# Patient Record
Sex: MALE | Race: WHITE | Employment: OTHER | URBAN - METROPOLITAN AREA
[De-identification: names, ages, dates, MRNs, and addresses within clinical notes are randomized per-mention and may not be internally consistent; named-entity substitution may affect disease eponyms.]

---

## 2017-12-27 ENCOUNTER — HOSPITAL ENCOUNTER (OUTPATIENT)
Dept: VASCULAR LAB | Age: 82
Discharge: HOME | End: 2017-12-27
Attending: SURGERY | Admitting: SURGERY

## 2017-12-27 DIAGNOSIS — I65.23 BILATERAL CAROTID ARTERY STENOSIS: Primary | ICD-10-CM

## 2017-12-27 DIAGNOSIS — I70.90 ARTERIAL OCCLUSIVE DISEASE: ICD-10-CM

## 2017-12-27 DIAGNOSIS — Z95.9 PRESENCE OF CARDIAC AND VASCULAR IMPLANT AND GRAFT: ICD-10-CM

## 2018-01-08 ENCOUNTER — HOSPITAL ENCOUNTER (OUTPATIENT)
Dept: VASCULAR LAB | Age: 83
Discharge: HOME | End: 2018-01-08
Attending: SURGERY | Admitting: SURGERY

## 2018-01-08 DIAGNOSIS — Z95.9 PRESENCE OF CARDIAC AND VASCULAR IMPLANT AND GRAFT: ICD-10-CM

## 2018-01-08 DIAGNOSIS — Z95.9 S/P ARTERIAL STENT: Primary | ICD-10-CM

## 2018-01-08 DIAGNOSIS — I70.90 OCCLUSIVE DISEASE, ARTERIAL: ICD-10-CM

## 2018-12-31 ENCOUNTER — ANESTHESIA EVENT (OUTPATIENT)
Dept: ENDOSCOPY | Age: 83
End: 2018-12-31
Payer: MEDICARE

## 2019-01-16 ENCOUNTER — HOSPITAL ENCOUNTER (OUTPATIENT)
Age: 84
Setting detail: OUTPATIENT SURGERY
Discharge: HOME | End: 2019-01-16
Attending: INTERNAL MEDICINE | Admitting: INTERNAL MEDICINE
Payer: MEDICARE

## 2019-01-16 ENCOUNTER — ANESTHESIA (OUTPATIENT)
Dept: ENDOSCOPY | Age: 84
End: 2019-01-16
Payer: MEDICARE

## 2019-01-16 VITALS — SYSTOLIC BLOOD PRESSURE: 99 MMHG | DIASTOLIC BLOOD PRESSURE: 71 MMHG | OXYGEN SATURATION: 100 %

## 2019-01-16 VITALS
BODY MASS INDEX: 18.55 KG/M2 | HEART RATE: 66 BPM | HEIGHT: 73 IN | DIASTOLIC BLOOD PRESSURE: 68 MMHG | WEIGHT: 140 LBS | OXYGEN SATURATION: 100 % | SYSTOLIC BLOOD PRESSURE: 145 MMHG | RESPIRATION RATE: 18 BRPM | TEMPERATURE: 97.5 F

## 2019-01-16 PROCEDURE — 7100000011 HC PHASE II RECOVERY - ADDTL 15 MIN: Performed by: INTERNAL MEDICINE

## 2019-01-16 PROCEDURE — 3700000001 HC ADD 15 MINUTES (ANESTHESIA): Performed by: INTERNAL MEDICINE

## 2019-01-16 PROCEDURE — 6370000000 HC RX 637 (ALT 250 FOR IP): Performed by: INTERNAL MEDICINE

## 2019-01-16 PROCEDURE — 7100000010 HC PHASE II RECOVERY - FIRST 15 MIN: Performed by: INTERNAL MEDICINE

## 2019-01-16 PROCEDURE — 3609009500 HC COLONOSCOPY DIAGNOSTIC OR SCREENING: Performed by: INTERNAL MEDICINE

## 2019-01-16 PROCEDURE — 2500000003 HC RX 250 WO HCPCS: Performed by: NURSE ANESTHETIST, CERTIFIED REGISTERED

## 2019-01-16 PROCEDURE — 2580000003 HC RX 258: Performed by: ANESTHESIOLOGY

## 2019-01-16 PROCEDURE — 2709999900 HC NON-CHARGEABLE SUPPLY: Performed by: INTERNAL MEDICINE

## 2019-01-16 PROCEDURE — 3700000000 HC ANESTHESIA ATTENDED CARE: Performed by: INTERNAL MEDICINE

## 2019-01-16 PROCEDURE — 6360000002 HC RX W HCPCS: Performed by: NURSE ANESTHETIST, CERTIFIED REGISTERED

## 2019-01-16 RX ORDER — PROPOFOL 10 MG/ML
INJECTION, EMULSION INTRAVENOUS PRN
Status: DISCONTINUED | OUTPATIENT
Start: 2019-01-16 | End: 2019-01-16 | Stop reason: SDUPTHER

## 2019-01-16 RX ORDER — LIDOCAINE HYDROCHLORIDE 10 MG/ML
1 INJECTION, SOLUTION EPIDURAL; INFILTRATION; INTRACAUDAL; PERINEURAL
Status: DISCONTINUED | OUTPATIENT
Start: 2019-01-16 | End: 2019-01-16 | Stop reason: HOSPADM

## 2019-01-16 RX ORDER — LIDOCAINE HYDROCHLORIDE 20 MG/ML
INJECTION, SOLUTION INFILTRATION; PERINEURAL PRN
Status: DISCONTINUED | OUTPATIENT
Start: 2019-01-16 | End: 2019-01-16 | Stop reason: SDUPTHER

## 2019-01-16 RX ORDER — SODIUM CHLORIDE 9 MG/ML
INJECTION, SOLUTION INTRAVENOUS CONTINUOUS
Status: DISCONTINUED | OUTPATIENT
Start: 2019-01-16 | End: 2019-01-16 | Stop reason: HOSPADM

## 2019-01-16 RX ORDER — MELOXICAM 7.5 MG/1
7.5 TABLET ORAL DAILY
COMMUNITY
End: 2022-05-14

## 2019-01-16 RX ADMIN — PROPOFOL 40 MG: 10 INJECTION, EMULSION INTRAVENOUS at 09:59

## 2019-01-16 RX ADMIN — SODIUM CHLORIDE: 9 INJECTION, SOLUTION INTRAVENOUS at 09:10

## 2019-01-16 RX ADMIN — PROPOFOL 100 MG: 10 INJECTION, EMULSION INTRAVENOUS at 09:48

## 2019-01-16 RX ADMIN — PROPOFOL 50 MG: 10 INJECTION, EMULSION INTRAVENOUS at 09:54

## 2019-01-16 RX ADMIN — LIDOCAINE HYDROCHLORIDE 100 MG: 20 INJECTION, SOLUTION INFILTRATION; PERINEURAL at 09:48

## 2019-01-16 ASSESSMENT — PAIN SCALES - GENERAL
PAINLEVEL_OUTOF10: 0

## 2019-01-16 ASSESSMENT — ENCOUNTER SYMPTOMS: SHORTNESS OF BREATH: 0

## 2019-10-10 ENCOUNTER — HOSPITAL ENCOUNTER (EMERGENCY)
Age: 84
Discharge: HOME | End: 2019-10-10
Payer: MEDICARE

## 2019-10-10 VITALS
OXYGEN SATURATION: 96 % | DIASTOLIC BLOOD PRESSURE: 76 MMHG | TEMPERATURE: 97.7 F | RESPIRATION RATE: 16 BRPM | HEIGHT: 74 IN | HEART RATE: 76 BPM | WEIGHT: 140 LBS | BODY MASS INDEX: 17.97 KG/M2 | SYSTOLIC BLOOD PRESSURE: 139 MMHG

## 2019-10-10 DIAGNOSIS — H61.21 HEARING LOSS OF RIGHT EAR DUE TO CERUMEN IMPACTION: Primary | ICD-10-CM

## 2019-10-10 PROCEDURE — 4500000022 HC ED LEVEL 2 PROCEDURE

## 2019-10-10 PROCEDURE — 99282 EMERGENCY DEPT VISIT SF MDM: CPT

## 2019-10-10 ASSESSMENT — ENCOUNTER SYMPTOMS
NAUSEA: 0
SHORTNESS OF BREATH: 0
CHEST TIGHTNESS: 0
SINUS PAIN: 0
DIARRHEA: 0
VOMITING: 0
SORE THROAT: 0
ABDOMINAL PAIN: 0
VOICE CHANGE: 0

## 2020-11-21 ENCOUNTER — HOSPITAL ENCOUNTER (EMERGENCY)
Age: 85
Discharge: HOME | End: 2020-11-21
Payer: MEDICARE

## 2020-11-21 VITALS
TEMPERATURE: 97.1 F | RESPIRATION RATE: 16 BRPM | HEART RATE: 70 BPM | HEIGHT: 74 IN | SYSTOLIC BLOOD PRESSURE: 138 MMHG | BODY MASS INDEX: 19.25 KG/M2 | DIASTOLIC BLOOD PRESSURE: 70 MMHG | OXYGEN SATURATION: 98 % | WEIGHT: 150 LBS

## 2020-11-21 PROCEDURE — 99283 EMERGENCY DEPT VISIT LOW MDM: CPT

## 2020-11-21 PROCEDURE — 69209 REMOVE IMPACTED EAR WAX UNI: CPT

## 2020-11-21 RX ORDER — OFLOXACIN 3 MG/ML
10 SOLUTION AURICULAR (OTIC) DAILY
Qty: 5 ML | Refills: 0 | Status: SHIPPED | OUTPATIENT
Start: 2020-11-21 | End: 2020-12-01

## 2020-11-21 ASSESSMENT — ENCOUNTER SYMPTOMS
VOMITING: 0
COUGH: 0
BACK PAIN: 0
NAUSEA: 0
ABDOMINAL PAIN: 0
EYE PAIN: 0
SHORTNESS OF BREATH: 0

## 2020-11-21 NOTE — ED PROVIDER NOTES
[address] [facility]        Pt Name: Sedrick Pollard  MRN: [id number]  [place] 1935  Date of evaluation: 11/21/2020  Provider: DONNA Plata  PCP: Marcelo Shields MD    DESIREE. I have evaluated this patient. My supervising physician was available for consultation. CHIEF COMPLAINT       Chief Complaint   Patient presents with    Hearing Problem     TO RIGHT EAR FOR PAST WEEK. REPORTS \"IT'S CLOGGED\"       HISTORY OF PRESENT ILLNESS   (Location, Timing/Onset, Context/Setting, Quality, Duration, Modifying Factors, Severity, Associated Signs and Symptoms)  Note limiting factors. Sedrick Pollard is a 81 y.o. male for Stacie Bath right ear hearing loss. Reports over the last week he has had progressively worsening hearing from his right ear. States it feels like it is clogged. Has had previous episodes of cerumen impaction. Denies headache, tenderness, fever, ear pain, purulent drainage, chest pain, shortness of breath, cough, nausea, vomiting. Denies alleviating or aggravating factors. Nursing Notes were all reviewed and agreed with or any disagreements were addressed in the HPI. REVIEW OF SYSTEMS    (2-9 systems for level 4, 10 or more for level 5)     Review of Systems   Constitutional: Negative for fever. HENT: Positive for hearing loss. Negative for ear discharge, ear pain and tinnitus. Eyes: Negative for pain and visual disturbance. Respiratory: Negative for cough and shortness of breath. Cardiovascular: Negative for chest pain. Gastrointestinal: Negative for abdominal pain, nausea and vomiting. Musculoskeletal: Negative for back pain and neck pain. Skin: Negative for rash. Neurological: Negative for dizziness, weakness, numbness and headaches. Psychiatric/Behavioral: Negative for confusion. Positives and Pertinent negatives as per HPI.   Except as noted above in the ROS, all other systems were reviewed and negative. PAST MEDICAL HISTORY     Past Medical History:   Diagnosis Date    Hyperlipidemia     Hypertension          SURGICAL HISTORY     Past Surgical History:   Procedure Laterality Date    COLONOSCOPY N/A 1/16/2019    COLONOSCOPY performed by Desean Trinidad MD at [address] [facility] Left 2018    THROMBECTOMY Right          [address]       Discharge Medication List as of 11/21/2020 11:49 AM      CONTINUE these medications which have NOT CHANGED    Details   meloxicam (MOBIC) 7.5 MG tablet Take 7.5 mg by mouth dailyHistorical Med      atenolol (TENORMIN) 50 MG tablet Historical Med      atorvastatin (LIPITOR) 20 MG tablet Historical Med      aspirin 81 MG EC tablet Take 81 mg by mouth. ALLERGIES     Patient has no known allergies. FAMILYHISTORY     History reviewed. No pertinent family history. SOCIAL HISTORY       Social History     Tobacco Use    Smoking status: Former Smoker     Packs/day: 1.00     Years: 40.00     Pack years: 40.00    Smokeless tobacco: Never Used   Substance Use Topics    Alcohol use: Yes     Alcohol/week: 4.2 standard drinks     Types: 5 Standard drinks or equivalent per week     Comment: 1 BEER/DAY    Drug use: No       SCREENINGS             PHYSICAL EXAM    (up to 7 for level 4, 8 or more for level 5)     ED Triage Vitals [11/21/20 1029]   BP Temp Temp Source Pulse Resp SpO2 Height Weight   138/70 97.1 °F (36.2 °C) Temporal 70 16 98 % 6' 2\" (1.88 m) 150 lb (68 kg)       Physical Exam  Vitals signs reviewed. Constitutional:       Appearance: He is not diaphoretic. HENT:      Head: Normocephalic and atraumatic. Right Ear: Tympanic membrane and external ear normal. There is impacted cerumen. Left Ear: Tympanic membrane, ear canal and external ear normal. There is no impacted cerumen. Ears:      Comments: No mastoid tenderness or swelling bilaterally.      Nose: No congestion or rhinorrhea. Eyes:      General: No scleral icterus. Conjunctiva/sclera: Conjunctivae normal.   Neck:      Musculoskeletal: Normal range of motion and neck supple. No neck rigidity or muscular tenderness. Pulmonary:      Effort: Pulmonary effort is normal. No respiratory distress. Breath sounds: No stridor. Musculoskeletal: Normal range of motion. Skin:     General: Skin is warm and dry. Neurological:      Mental Status: He is alert. Gait: Gait normal.   Psychiatric:         Mood and Affect: Mood normal.         Behavior: Behavior normal.         DIAGNOSTIC RESULTS   LABS:    Labs Reviewed - No data to display    All other labs were within normal range or not returned as of this dictation. EKG: All EKG's are interpreted by the Emergency Department Physician in the absence of a cardiologist.  Please see their note for interpretation of EKG. RADIOLOGY:   Non-plain film images such as CT, Ultrasound and MRI are read by the radiologist. Plain radiographic images are visualized and preliminarily interpreted by the ED Provider with the below findings:        Interpretation per the Radiologist below, if available at the time of this note:    No orders to display     No results found. PROCEDURES   Unless otherwise noted below, none     Ear Wax Removal    Date/Time: 11/21/2020 10:00 PM  Performed by: DONNA Perdomo  Authorized by: DONNA Perdomo     Consent:     Consent obtained:  Verbal    Consent given by:  Patient    Risks discussed:  Bleeding, infection, incomplete removal and TM perforation    Alternatives discussed:  No treatment  Procedure details:     Location:  R ear    Procedure type: irrigation      Procedure type comment:  Peroxide used to soften cerumen. Irrigation followed by intermittent curette. Post-procedure details:      Inspection:  TM intact and bleeding (Small bleeding of right external canal, no perforation or bleeding from TM appreciated.) Hearing quality:  Improved    Patient tolerance of procedure: Tolerated well, no immediate complications        CRITICAL CARE TIME   N/A    CONSULTS:  None      EMERGENCY DEPARTMENT COURSE and DIFFERENTIAL DIAGNOSIS/MDM:   Vitals:    Vitals:    11/21/20 1029   BP: 138/70   Pulse: 70   Resp: 16   Temp: 97.1 °F (36.2 °C)   TempSrc: Temporal   SpO2: 98%   Weight: 150 lb (68 kg)   Height: 6' 2\" (1.88 m)       Patient was given the following medications:  Medications - No data to display        52-year-old male presents emergency department for right ear hearing loss over 1 week. Exam is notable for impacted cerumen. Plan to perform cerumen disimpaction, reevaluate. Cerumen removal per procedure note. Patient noted resolution of his hearing loss. Pain following procedure. There was mild bleeding on external ear canal, ofloxin otic drops prescribed. Appropriate for discharge with outpatient follow-up. Given prescription for Debrox. Structure with primary care provider to ideally be seen within 1 week. Instructed to return return for new or worsening symptoms including to fever, hearing loss, ear pain, purulent discharge, headache, vision changes, any other symptoms he is concerned about. Verbal and written discharge instructions and return precautions given. FINAL IMPRESSION      1. Impacted cerumen of right ear          DISPOSITION/PLAN   DISPOSITION Decision To Discharge 11/21/2020 11:49:05 AM      PATIENT REFERREDTO:  MD [address]  [address] [facility] [address]  [phone]    Call in 1 day  Call to schedule primary care follow-up, ideally to be seen within 1 week.       DISCHARGE MEDICATIONS:  Discharge Medication List as of 11/21/2020 11:49 AM      START taking these medications    Details   carbamide peroxide (DEBROX) 6.5 % otic solution Place 5 drops into both ears as needed (Ear wax buildup), Disp-1 Bottle,R-0Print      ofloxacin (FLOXIN) 0.3 % otic solution Place 10 drops into the right ear daily for 10 days, Disp-5 mL,R-0Print             DISCONTINUED MEDICATIONS:  Discharge Medication List as of 11/21/2020 11:49 AM                 (Please note that portions of this note were completed with a voice recognition program.  Efforts were made to edit the dictations but occasionally words are mis-transcribed.)    DONNA Ann (electronically signed)         DONNA Ann  11/21/20 8022

## 2022-05-14 ENCOUNTER — HOSPITAL ENCOUNTER (EMERGENCY)
Age: 87
Discharge: HOME | End: 2022-05-14
Attending: EMERGENCY MEDICINE
Payer: MEDICARE

## 2022-05-14 ENCOUNTER — APPOINTMENT (OUTPATIENT)
Dept: CT IMAGING | Age: 87
End: 2022-05-14
Payer: MEDICARE

## 2022-05-14 VITALS
HEART RATE: 94 BPM | DIASTOLIC BLOOD PRESSURE: 76 MMHG | WEIGHT: 155.8 LBS | SYSTOLIC BLOOD PRESSURE: 154 MMHG | BODY MASS INDEX: 19.37 KG/M2 | OXYGEN SATURATION: 100 % | TEMPERATURE: 98.6 F | RESPIRATION RATE: 14 BRPM | HEIGHT: 75 IN

## 2022-05-14 DIAGNOSIS — R93.89 ABNORMAL CT OF THE CHEST: ICD-10-CM

## 2022-05-14 DIAGNOSIS — S46.911A STRAIN OF RIGHT SHOULDER, INITIAL ENCOUNTER: ICD-10-CM

## 2022-05-14 DIAGNOSIS — S16.1XXA ACUTE STRAIN OF NECK MUSCLE, INITIAL ENCOUNTER: Primary | ICD-10-CM

## 2022-05-14 PROCEDURE — 72125 CT NECK SPINE W/O DYE: CPT

## 2022-05-14 PROCEDURE — 71250 CT THORAX DX C-: CPT

## 2022-05-14 PROCEDURE — 99284 EMERGENCY DEPT VISIT MOD MDM: CPT

## 2022-05-14 ASSESSMENT — PAIN SCALES - GENERAL: PAINLEVEL_OUTOF10: 4

## 2022-05-14 ASSESSMENT — PAIN - FUNCTIONAL ASSESSMENT: PAIN_FUNCTIONAL_ASSESSMENT: 0-10

## 2022-05-14 NOTE — ED PROVIDER NOTES
did paint a large deck and is right handed. Images are negative for acute fracture and we have provided soft tissue pain instructions. He will need follow up for the pulmonary abnormality noted. Vinh Brenner was given appropriate discharge instructions. Referral to follow up provider. For further details of [facility] Emergency Department encounter, please see documentation by advanced practice provider DONNA Haley. RADIOLOGY:     CT CERVICAL SPINE WO CONTRAST   Final Result      No evidence of fracture with multilevel degenerative changes as described. CT CHEST WO CONTRAST   Final Result   Severe emphysema with new nodular densities in the right lung compared to   2014. Recommend 3 month imaging follow-up for further characterization as   these may be postinflammatory or infectious      Moderate coronary artery calcification noted. [facility]  [phone]  Schedule an appointment as soon as possible for a visit in 1 week      [facility] Emergency Department  [address]  Go to   If symptoms worsen    FINAL IMPRESSION:    1. Acute strain of neck muscle, initial encounter    2. Strain of right shoulder, initial encounter    3.  Abnormal CT of the chest       DISPOSITION Decision To Discharge 05/14/2022 09:20:30 AM         Karo Ferris MD  05/14/22 1029

## 2022-05-14 NOTE — ED PROVIDER NOTES
[address]        Pt Name: Luz Gorman  MRN: [id number]  [place] 1935  Date of evaluation: 5/14/2022  Provider: Kailee Dillon PA-C  PCP: No primary care provider on file. Note Started: 8:34 AM EDT       DESIREE. I have evaluated this patient. My supervising physician was available for consultation. Lm Prieto MD      CHIEF COMPLAINT       Chief Complaint   Patient presents with    Neck Pain     patient complains of right sided neck pain with movement, pain is down into right side of chest with movement. HISTORY OF PRESENT ILLNESS   (Location, Timing/Onset, Context/Setting, Quality, Duration, Modifying Factors, Severity, Associated Signs and Symptoms)  Note limiting factors. Chief Complaint: Right neck pain, right chest pain    Luz Gorman is a 81 y.o. male who presents with the above complaint. Onset 48 hours ago following PT and a 16 x 16 foot deck. Gradual progression. Worse with movement such as ear to shoulder left with right neck pain as well as some with rotation. Also discomfort with range of motion right shoulder. He feels discomfort right lateral chest with deep breath. Indicates no chest pressure or shortness of breath. He indicates no gastrointestinal or urinary complaints. The patient with history of HTN, HLD and OA. I do see med list of atenolol, Lipitor and meloxicam.  Also takes aspirin 81 mg daily. Nursing Notes were all reviewed and agreed with or any disagreements were addressed in the HPI. REVIEW OF SYSTEMS    (2-9 systems for level 4, 10 or more for level 5)     Review of Systems    Positives and Pertinent negatives as per HPI. Except as noted above in the ROS, all other systems were reviewed and negative.        PAST MEDICAL HISTORY     Past Medical History:   Diagnosis Date    Hyperlipidemia     Hypertension          SURGICAL HISTORY     Past Surgical History:   Procedure Laterality Date    COLONOSCOPY N/A 1/16/2019    COLONOSCOPY performed by Kurtis Yuen MD at [address] [facility] Left 2018    THROMBECTOMY Right          CURRENTMEDICATIONS       Previous Medications    ASPIRIN 81 MG EC TABLET    Take 81 mg by mouth. ATENOLOL (TENORMIN) 50 MG TABLET        ATORVASTATIN (LIPITOR) 20 MG TABLET             ALLERGIES     Patient has no known allergies. FAMILYHISTORY     History reviewed. No pertinent family history. SOCIAL HISTORY       Social History     Tobacco Use    Smoking status: Former Smoker     Packs/day: 1.00     Years: 40.00     Pack years: 40.00    Smokeless tobacco: Never Used   Vaping Use    Vaping Use: Never used   Substance Use Topics    Alcohol use: Yes     Alcohol/week: 4.2 standard drinks     Types: 5 Standard drinks or equivalent per week     Comment: 1 BEER/DAY    Drug use: No       SCREENINGS    [place] Coma Scale  Eye Opening: Spontaneous  Best Verbal Response: Oriented  Best Motor Response: Obeys commands  Noelle Coma Scale Score: 15        PHYSICAL EXAM    (up to 7 for level 4, 8 or more for level 5)     ED Triage Vitals [05/14/22 0747]   BP Temp Temp Source Pulse Resp SpO2 Height Weight   (!) 154/76 98.2 °F (36.8 °C) Oral 94 12 96 % 6' 3\" (1.905 m) 155 lb 12.8 oz (70.7 kg)       Physical Exam  Vitals and nursing note reviewed. Constitutional:       Appearance: Normal appearance. He is well-developed and normal weight. HENT:      Head: Normocephalic and atraumatic. Right Ear: External ear normal.      Left Ear: External ear normal.   Eyes:      General: No scleral icterus. Right eye: No discharge. Left eye: No discharge. Conjunctiva/sclera: Conjunctivae normal.   Cardiovascular:      Rate and Rhythm: Normal rate and regular rhythm. Heart sounds: Normal heart sounds. Pulmonary:      Effort: Pulmonary effort is normal.      Breath sounds: Normal breath sounds. Chest:      Chest wall: Tenderness present. Musculoskeletal:         General: Normal range of motion. Cervical back: Normal range of motion and neck supple. Right lower leg: No edema. Left lower leg: No edema. Comments: Patient with some tenderness over the posterior musculature of the neck on the right and along trapezius on the right. No midline bony tenderness. Skin:     General: Skin is warm and dry. Findings: No rash. Neurological:      General: No focal deficit present. Mental Status: He is alert and oriented to person, place, and time. Mental status is at baseline. Sensory: No sensory deficit. Motor: No weakness. Psychiatric:         Mood and Affect: Mood normal.         Behavior: Behavior normal.         Thought Content: Thought content normal.         Judgment: Judgment normal.         DIAGNOSTIC RESULTS   LABS:    Labs Reviewed - No data to display    When ordered only abnormal lab results are displayed. All other labs were within normal range or not returned as of this dictation. EKG: When ordered, EKG's are interpreted by the Emergency Department Physician in the absence of a cardiologist.  Please see their note for interpretation of EKG. RADIOLOGY:   Non-plain film images such as CT, Ultrasound and MRI are read by the radiologist. Plain radiographic images are visualized and preliminarily interpreted by the ED Provider with the below findings:        Interpretation per the Radiologist below, if available at the time of this note:    CT CERVICAL SPINE WO CONTRAST   Final Result      No evidence of fracture with multilevel degenerative changes as described. CT CHEST WO CONTRAST   Final Result   Severe emphysema with new nodular densities in the right lung compared to   2014. Recommend 3 month imaging follow-up for further characterization as   these may be postinflammatory or infectious      Moderate coronary artery calcification noted. No results found. PROCEDURES   Unless otherwise noted below, none     Procedures    CRITICAL CARE TIME       CONSULTS:  None      EMERGENCY DEPARTMENT COURSE and DIFFERENTIAL DIAGNOSIS/MDM:   Vitals:    Vitals:    05/14/22 0747 05/14/22 0752   BP: (!) 154/76 (!) 154/76   Pulse: 94 94   Resp: 12 14   Temp: 98.2 °F (36.8 °C) 98.6 °F (37 °C)   TempSrc: Oral Oral   SpO2: 96% 100%   Weight: 155 lb 12.8 oz (70.7 kg) 155 lb 12.8 oz (70.7 kg)   Height: 6' 3\" (1.905 m) 6' 3\" (1.905 m)       Patient was given the following medications:  Medications - No data to display      Is this patient to be included in the SEP-1 Core Measure due to severe sepsis or septic shock? No   Exclusion criteria - the patient is NOT to be included for SEP-1 Core Measure due to: Infection is not suspected    This patient presenting with right neck pain and right chest pain following painting of a 16 foot x 16 foot deck. Onset 48 hours ago with progression. CT scan neck and chest without IV contrast showing no concerning pathology. Incidental finding of arthritic changes along with COPD changes. The patient is informed. Patient be treated conservatively with OTC Aleve 220 mg taking 1 tablet or 3 times daily with food and he is aware to add Tylenol 1000 mg every 8 hours as needed for additional pain control. Ice that she may benefit. The patient did express understanding of his diagnosis and treatment plan. This case was reviewed with attending physician who personally evaluated the patient. FINAL IMPRESSION      1. Acute strain of neck muscle, initial encounter    2.  Strain of right shoulder, initial encounter          DISPOSITION/PLAN   DISPOSITION Decision To Discharge 05/14/2022 09:20:30 AM      PATIENT REFERRED TO:  [facility] Pre-Services  [phone]  Schedule an appointment as soon as possible for a visit in 1 week      [facility] Emergency Department  [place] [address]  [phone]  Go to   If symptoms worsen      DISCHARGE MEDICATIONS:  New Prescriptions    No medications on file       DISCONTINUED MEDICATIONS:  Discontinued Medications    MELOXICAM (MOBIC) 7.5 MG TABLET    Take 7.5 mg by mouth daily              (Please note that portions of this note were completed with a voice recognition program.  Efforts were made to edit the dictations but occasionally words are mis-transcribed. )    Chelsea Navarrete PA-C (electronically signed)           Chelsea Navarrete PA-C  05/14/22 0649

## 2022-05-23 ENCOUNTER — OFFICE VISIT (OUTPATIENT)
Dept: FAMILY MEDICINE CLINIC | Age: 87
End: 2022-05-23
Payer: MEDICARE

## 2022-05-23 VITALS
OXYGEN SATURATION: 97 % | DIASTOLIC BLOOD PRESSURE: 66 MMHG | HEART RATE: 110 BPM | TEMPERATURE: 97.2 F | WEIGHT: 154 LBS | HEIGHT: 75 IN | SYSTOLIC BLOOD PRESSURE: 114 MMHG | BODY MASS INDEX: 19.15 KG/M2

## 2022-05-23 DIAGNOSIS — J43.1 PANLOBULAR EMPHYSEMA (HCC): ICD-10-CM

## 2022-05-23 DIAGNOSIS — Z76.89 ENCOUNTER TO ESTABLISH CARE: Primary | ICD-10-CM

## 2022-05-23 DIAGNOSIS — R91.8 PULMONARY NODULES: ICD-10-CM

## 2022-05-23 DIAGNOSIS — M54.2 NECK PAIN: ICD-10-CM

## 2022-05-23 DIAGNOSIS — I73.9 PERIPHERAL VASCULAR DISEASE (HCC): ICD-10-CM

## 2022-05-23 DIAGNOSIS — Z09 HOSPITAL DISCHARGE FOLLOW-UP: ICD-10-CM

## 2022-05-23 PROBLEM — E78.2 MIXED HYPERLIPIDEMIA: Status: ACTIVE | Noted: 2022-05-23

## 2022-05-23 PROBLEM — H54.8 LEGALLY BLIND IN LEFT EYE, AS DEFINED IN USA: Status: ACTIVE | Noted: 2022-05-23

## 2022-05-23 PROCEDURE — 99204 OFFICE O/P NEW MOD 45 MIN: CPT | Performed by: NURSE PRACTITIONER

## 2022-05-23 RX ORDER — METOPROLOL SUCCINATE 50 MG/1
TABLET, EXTENDED RELEASE ORAL
COMMUNITY
Start: 2022-02-21

## 2022-05-23 SDOH — ECONOMIC STABILITY: TRANSPORTATION INSECURITY
IN THE PAST 12 MONTHS, HAS LACK OF TRANSPORTATION KEPT YOU FROM MEETINGS, WORK, OR FROM GETTING THINGS NEEDED FOR DAILY LIVING?: NO

## 2022-05-23 SDOH — ECONOMIC STABILITY: TRANSPORTATION INSECURITY
IN THE PAST 12 MONTHS, HAS THE LACK OF TRANSPORTATION KEPT YOU FROM MEDICAL APPOINTMENTS OR FROM GETTING MEDICATIONS?: NO

## 2022-05-23 SDOH — ECONOMIC STABILITY: FOOD INSECURITY: WITHIN THE PAST 12 MONTHS, THE FOOD YOU BOUGHT JUST DIDN'T LAST AND YOU DIDN'T HAVE MONEY TO GET MORE.: NEVER TRUE

## 2022-05-23 SDOH — ECONOMIC STABILITY: FOOD INSECURITY: WITHIN THE PAST 12 MONTHS, YOU WORRIED THAT YOUR FOOD WOULD RUN OUT BEFORE YOU GOT MONEY TO BUY MORE.: NEVER TRUE

## 2022-05-23 ASSESSMENT — ENCOUNTER SYMPTOMS
ABDOMINAL DISTENTION: 0
EYE PAIN: 0
COUGH: 0
SINUS PAIN: 0
EYE DISCHARGE: 0
DIARRHEA: 0
SORE THROAT: 0
ABDOMINAL PAIN: 0
VOMITING: 0
SINUS PRESSURE: 0
SHORTNESS OF BREATH: 0
WHEEZING: 0
EYE REDNESS: 0
NAUSEA: 0

## 2022-05-23 ASSESSMENT — PATIENT HEALTH QUESTIONNAIRE - PHQ9
2. FEELING DOWN, DEPRESSED OR HOPELESS: 0
SUM OF ALL RESPONSES TO PHQ QUESTIONS 1-9: 0
SUM OF ALL RESPONSES TO PHQ QUESTIONS 1-9: 0
SUM OF ALL RESPONSES TO PHQ9 QUESTIONS 1 & 2: 0
1. LITTLE INTEREST OR PLEASURE IN DOING THINGS: 0
SUM OF ALL RESPONSES TO PHQ QUESTIONS 1-9: 0
SUM OF ALL RESPONSES TO PHQ QUESTIONS 1-9: 0

## 2022-05-23 ASSESSMENT — SOCIAL DETERMINANTS OF HEALTH (SDOH): HOW HARD IS IT FOR YOU TO PAY FOR THE VERY BASICS LIKE FOOD, HOUSING, MEDICAL CARE, AND HEATING?: NOT HARD AT ALL

## 2022-05-23 NOTE — PATIENT INSTRUCTIONS
They want you to repeat a CT scan in 3 months to check your lungs to ensure some new spots on your lungs have not changed in size. We'll see you in early July for your wellness visit with fasting labs.

## 2022-05-23 NOTE — PROGRESS NOTES
Natalia Best (:  1935) is a 81 y.o. male,Established patient, here for evaluation of the following chief complaint(s):  New Patient (NEW PATIENT EST CARE, LAST PCP RETIRED ) and Follow-Up from Hospital (ER FOLLOW UP, NECK STRAIN )      ASSESSMENT/PLAN:  1. Encounter to establish care  -The patient's medical, surgical, social, and family history were reviewed with the patient. Medications were reconciled. -Patient had annual wellness visit performed in July last year. He had follow-up at that time for annual wellness visit for this year with fasting labs. 2. Hospital discharge follow-up  -Emergency room notes and results were reviewed. No changes in medications. 3. Neck pain  -Improving on its own without intervention. Continue to monitor symptoms. Follow-up as needed. 4. Pulmonary nodules  -3 months CT without contrast follow-up being ordered for incidental new nodules on the right lung. Educated patient on results  -     [address]; Future  5. Panlobular emphysema (HCC)  -CT showed severe emphysema. The patient denies any shortness of breath without intervention. Follow-up as needed. 6. Peripheral vascular disease ([address]  -Saw previous Doppler studies completed as well as ankle-brachial index. No additional intervention indicated at this time. Return in about 6 weeks (around 2022) for Annual Wellness Visit with fasting labs. SUBJECTIVE/OBJECTIVE:  CAROL Orona presents today to establish care as well as emergency room follow-up. He denies any concerns today. He was seen in the emergency room on  due to neck pain. He had developed a strain from painting a deck. He states that he still has some neck pain to the right side, but has been improving with time. He does not feel any additional intervention is needed. Incidentally, CT showed pulmonary nodules on the right side. Recommended 3-month follow-up.     Kristian Orona has a history of peripheral vascular disease but the left lower extremity. He also has a history of emphysema which is controlled with without inhaler, hypertension, hyperlipidemia, and issues with the left eye. 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 He has previously seen dermatology and urology. He is retired but used to be an . He is remarried. His current wife is a , so they travel quite frequently. He loves to fish. 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 He states that he will typically have a beer a day, sometimes 2. His wife monitors his drinking closely. Has never been an issue, and he states he never gets drunk. He used to smoke, but he quit before his first marriage. Has multiple kids and grandkids. Review of Systems   Constitutional: Negative for chills and fever. HENT: Negative for ear discharge, ear pain, hearing loss, sinus pressure, sinus pain and sore throat. Eyes: Negative for pain, discharge and redness. Respiratory: Negative for cough, shortness of breath and wheezing. Cardiovascular: Negative for chest pain and palpitations. Gastrointestinal: Negative for abdominal distention, abdominal pain, diarrhea, nausea and vomiting. Genitourinary: Negative for dysuria and hematuria. Musculoskeletal: Positive for myalgias. Skin: Negative for rash. Neurological: Negative for dizziness, weakness, light-headedness, numbness and headaches. Psychiatric/Behavioral: Negative for decreased concentration, dysphoric mood and sleep disturbance. The patient is not nervous/anxious. Physical Exam  Vitals reviewed. Constitutional:       Appearance: Normal appearance. He is normal weight. HENT:      Head: Normocephalic and atraumatic. Right Ear: Tympanic membrane, ear canal and external ear normal.      Left Ear: Tympanic membrane, ear canal and external ear normal.      Nose: Nose normal.      Mouth/Throat:      Mouth: Mucous membranes are moist.      Pharynx: Oropharynx is clear.  No posterior oropharyngeal erythema. Eyes:      General: No scleral icterus. Right eye: No discharge. Left eye: No discharge. Extraocular Movements: Extraocular movements intact. Pupils: Pupils are equal, round, and reactive to light. Neck:      Vascular: No carotid bruit. Cardiovascular:      Rate and Rhythm: Normal rate and regular rhythm. Pulses: Normal pulses. Heart sounds: Normal heart sounds. No murmur heard. No gallop. Pulmonary:      Effort: Pulmonary effort is normal.      Breath sounds: Normal breath sounds. No wheezing. Abdominal:      General: Bowel sounds are normal.      Palpations: Abdomen is soft. Tenderness: There is no abdominal tenderness. There is no guarding or rebound. Musculoskeletal:         General: Normal range of motion. Cervical back: Normal range of motion and neck supple. Lymphadenopathy:      Cervical: No cervical adenopathy. Skin:     General: Skin is warm and dry. Capillary Refill: Capillary refill takes less than 2 seconds. Neurological:      Mental Status: He is alert and oriented to person, place, and time. Mental status is at baseline. Comments: Mild forgetfulness   Psychiatric:         Mood and Affect: Mood normal.         Behavior: Behavior normal.         Thought Content: Thought content normal.         Judgment: Judgment normal.               This dictation was generated by voice recognition computer software. Although all attempts are made to edit the dictation for accuracy, there may be errors in the transcription that are not intended. An electronic signature was used to authenticate this note.     --NATALEE Abad - CNP

## 2022-07-14 ENCOUNTER — HOSPITAL ENCOUNTER (EMERGENCY)
Age: 87
Discharge: HOME | End: 2022-07-14
Payer: MEDICARE

## 2022-07-14 VITALS
HEART RATE: 80 BPM | DIASTOLIC BLOOD PRESSURE: 89 MMHG | TEMPERATURE: 97.9 F | WEIGHT: 145.6 LBS | SYSTOLIC BLOOD PRESSURE: 168 MMHG | RESPIRATION RATE: 20 BRPM | BODY MASS INDEX: 18.2 KG/M2 | OXYGEN SATURATION: 94 %

## 2022-07-14 DIAGNOSIS — S05.01XA ABRASION OF RIGHT CORNEA, INITIAL ENCOUNTER: Primary | ICD-10-CM

## 2022-07-14 PROCEDURE — 99283 EMERGENCY DEPT VISIT LOW MDM: CPT

## 2022-07-14 RX ORDER — ERYTHROMYCIN 5 MG/G
OINTMENT OPHTHALMIC
Qty: 1 EACH | Refills: 0 | Status: SHIPPED | OUTPATIENT
Start: 2022-07-14 | End: 2022-07-24

## 2022-07-14 RX ORDER — SULFACETAMIDE SODIUM 100 MG/ML
SOLUTION/ DROPS OPHTHALMIC
Status: DISCONTINUED
Start: 2022-07-14 | End: 2022-07-14 | Stop reason: HOSPADM

## 2022-07-14 RX ORDER — PROPARACAINE HYDROCHLORIDE 5 MG/ML
SOLUTION/ DROPS OPHTHALMIC
Status: DISCONTINUED
Start: 2022-07-14 | End: 2022-07-14 | Stop reason: HOSPADM

## 2022-07-14 RX ORDER — BALANCED SALT SOLUTION ENRICHED WITH BICARBONATE, DEXTROSE, AND GLUTATHIONE
KIT INTRAOCULAR
Status: DISCONTINUED
Start: 2022-07-14 | End: 2022-07-14 | Stop reason: HOSPADM

## 2022-07-14 ASSESSMENT — PAIN SCALES - GENERAL: PAINLEVEL_OUTOF10: 7

## 2022-07-14 ASSESSMENT — ENCOUNTER SYMPTOMS
COUGH: 0
EYE ITCHING: 0
PHOTOPHOBIA: 0
NAUSEA: 0
EYE DISCHARGE: 0
EYE REDNESS: 0
VOMITING: 0
EYE PAIN: 1

## 2022-07-14 ASSESSMENT — PAIN DESCRIPTION - ORIENTATION: ORIENTATION: RIGHT

## 2022-07-14 ASSESSMENT — VISUAL ACUITY
OS: 20/200
OD: 20/40
OU: 20/30

## 2022-07-14 ASSESSMENT — PAIN - FUNCTIONAL ASSESSMENT: PAIN_FUNCTIONAL_ASSESSMENT: 0-10

## 2022-07-14 ASSESSMENT — PAIN DESCRIPTION - LOCATION: LOCATION: EYE

## 2022-07-14 NOTE — ED PROVIDER NOTES
[facility]        Pt Name: Mya Garcia  MRN: [id number]  [place] 1935  Date of evaluation: 7/14/2022  Provider: Merle Arenas PA-C  PCP: NATALEE Hess CNP  Note Started: 7:19 PM EDT       DESIREE. I have evaluated this patient. My supervising physician was available for consultation. CHIEF COMPLAINT       Chief Complaint   Patient presents with    Foreign Body in Eye     Pt reports irritation to right eye \"feels like something is in there\" since approx 1200 today. Denies vision change in right eye       HISTORY OF PRESENT ILLNESS   (Location, Timing/Onset, Context/Setting, Quality, Duration, Modifying Factors, Severity, Associated Signs and Symptoms)  Note limiting factors. Chief Complaint: Foreign body in right eye    Mya Garcia is a 81 y.o. male who presents to the emergency department today for evaluation for a foreign body in his right eye. The patient states that he was outside, working in the yard, and he believes that something may have gotten into his eye. The patient states that he has been trying to use eyedrops, but continues to have the sensation that there is something in his eye. The patient states that he was not recently working with metal.  The patient states that he wears glasses. 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 Does not wear contacts. He has no headaches. No visual changes. No nausea. No vomiting. No fever chills per no cough or congestion. Patient otherwise has no other complaints at this time    Nursing Notes were all reviewed and agreed with or any disagreements were addressed in the HPI. REVIEW OF SYSTEMS    (2-9 systems for level 4, 10 or more for level 5)     Review of Systems   Constitutional: Negative for activity change, appetite change and fever. HENT: Negative for congestion. Eyes: Positive for pain.  Negative for photophobia, discharge, redness, itching and visual disturbance. Respiratory: Negative for cough. Gastrointestinal: Negative for nausea and vomiting. Neurological: Negative for headaches. Positives and Pertinent negatives as per HPI. Except as noted above in the ROS, all other systems were reviewed and negative. PAST MEDICAL HISTORY     Past Medical History:   Diagnosis Date    Hyperlipidemia     Hypertension          SURGICAL HISTORY     Past Surgical History:   Procedure Laterality Date    COLONOSCOPY N/A 1/16/2019    COLONOSCOPY performed by Fredi Meza MD at [address] [facility] Left 2018    THROMBECTOMY Right          [address]       Discharge Medication List as of 7/14/2022  7:00 PM      CONTINUE these medications which have NOT CHANGED    Details   metoprolol succinate (TOPROL XL) 50 MG extended release tablet TAKE 1 TABLET BY MOUTH DAILYHistorical Med      atenolol (TENORMIN) 50 MG tablet Historical Med      atorvastatin (LIPITOR) 20 MG tablet Historical Med      aspirin 81 MG EC tablet Take 81 mg by mouth. ALLERGIES     Patient has no known allergies. FAMILYHISTORY     History reviewed. No pertinent family history. SOCIAL HISTORY       Social History     Tobacco Use    Smoking status: Former Smoker     Packs/day: 1.00     Years: 40.00     Pack years: 40.00    Smokeless tobacco: Never Used   Vaping Use    Vaping Use: Never used   Substance Use Topics    Alcohol use:  Yes     Alcohol/week: 4.2 standard drinks     Types: 5 Standard drinks or equivalent per week     Comment: 1 BEER/DAY    Drug use: No       SCREENINGS    Albert Lea Coma Scale  Eye Opening: Spontaneous  Best Verbal Response: Oriented  Best Motor Response: Obeys commands  Albert Lea Coma Scale Score: 15        PHYSICAL EXAM    (up to 7 for level 4, 8 or more for level 5)     ED Triage Vitals [07/14/22 1817]   BP Temp Temp Source Heart Rate Resp SpO2 Height Weight   (!) 168/89 97.9 °F (36.6 °C) Oral 80 20 94 % -- 145 lb 9.6 oz (66 kg)       Physical Exam  Vitals and nursing note reviewed. Constitutional:       Appearance: He is well-developed. He is not diaphoretic. HENT:      Head: Normocephalic and atraumatic. Right Ear: External ear normal.      Left Ear: External ear normal.      Nose: Nose normal.   Eyes:      General:         Right eye: No discharge. Left eye: No discharge. Extraocular Movements: Extraocular movements intact. Conjunctiva/sclera: Conjunctivae normal.      Pupils: Pupils are equal, round, and reactive to light. Comments: Fluorescein stain performed by myself. A small corneal abrasion noted around the 11 o'clock position. No foreign body noted. No corneal ulcers. No dendritic lesions. No Antoni sign. There is no erythema, warmth over the periorbit. Normal extraocular eye movements. Neck:      Trachea: No tracheal deviation. Pulmonary:      Effort: Pulmonary effort is normal. No respiratory distress. Musculoskeletal:         General: Normal range of motion. Cervical back: Normal range of motion and neck supple. Skin:     General: Skin is warm and dry. Neurological:      Mental Status: He is alert and oriented to person, place, and time. Psychiatric:         Behavior: Behavior normal.         DIAGNOSTIC RESULTS   LABS:    Labs Reviewed - No data to display    When ordered only abnormal lab results are displayed. All other labs were within normal range or not returned as of this dictation. EKG: When ordered, EKG's are interpreted by the Emergency Department Physician in the absence of a cardiologist.  Please see their note for interpretation of EKG.     RADIOLOGY:   Non-plain film images such as CT, Ultrasound and MRI are read by the radiologist. Plain radiographic images are visualized and preliminarily interpreted by the ED Provider with the below findings:        Interpretation per the Radiologist below, if available at the time of this note:    No orders to display     No results found. PROCEDURES   Unless otherwise noted below, none     Procedures    CRITICAL CARE TIME       CONSULTS:  None      EMERGENCY DEPARTMENT COURSE and DIFFERENTIAL DIAGNOSIS/MDM:   Vitals:    Vitals:    07/14/22 1817   BP: (!) 168/89   Pulse: 80   Resp: 20   Temp: 97.9 °F (36.6 °C)   TempSrc: Oral   SpO2: 94%   Weight: 145 lb 9.6 oz (66 kg)       Patient was given the following medications:  Medications   sulfacetamide (BLEPH-10) 10 % ophthalmic solution (has no administration in time range)   balanced salts plus (BSS) ophthalmic solution (has no administration in time range)   fluorescein 1 MG ophthalmic strip (has no administration in time range)   proparacaine (ALCAINE) 0.5 % ophthalmic solution (has no administration in time range)         Is this patient to be included in the SEP-1 Core Measure due to severe sepsis or septic shock? No   Exclusion criteria - the patient is NOT to be included for SEP-1 Core Measure due to: Infection is not suspected    Briefly, this is a 60-year-old male who presents emergency department today for evaluation for concerns of a foreign body in his right eye. Patient states that he was doing yard work, and felt that there was something that went into his right eye. Patient was not working with metal.  Patient denies any pain to the eye. No visual changes. On examination, he does have a small corneal abrasion noted. Otherwise unremarkable. Visual acuity is unremarkable. Will begin erythromycin eye ointment, he states he does have an ophthalmologist that he will follow-up within the next 1 to 2 days. He is to return to the ED for any new or worsening symptoms. Patient was understanding agreeable plan. Stable for discharge. .   No results found for this visit on 07/14/22.     I estimate there is LOW risk for a CORNEAL or LID FOREIGN BODY or ULCERATION, DEEP SPACE INFECTION (e.g., ORBITAL CELLULITIS OR ABSCESS), GLAUCOMA, MENINGITIS, PENETRATING GLOBE INJURY, or RETINAL DETACHMENT, thus I consider the discharge disposition reasonable. Also, there is no evidence or peritonitis, sepsis, or toxicity. Muriel Rahman and I have discussed the diagnosis and risks, and we agree with discharging home to follow-up with their primary doctor. We also discussed returning to the Emergency Department immediately if new or worsening symptoms occur. We have discussed the symptoms which are most concerning (e.g., changing or worsening pain, vision changes, neck stiffness or fever) that necessitate immediate return. FINAL IMPRESSION      1.  Abrasion of right cornea, initial encounter          DISPOSITION/PLAN   DISPOSITION Decision To Discharge 07/14/2022 07:06:16 PM      PATIENT REFERRED TO:  Your eye doctor    Call in 2 days      [facility] Emergency Department  [address] [place] [address]  [phone]    As needed, If symptoms worsen      DISCHARGE MEDICATIONS:  Discharge Medication List as of 7/14/2022  7:00 PM      START taking these medications    Details   erythromycin (ROMYCIN) 5 MG/GM ophthalmic ointment Apply a small ribbon to the affected eye twice a day for one week, Disp-1 each, R-0, Normal             DISCONTINUED MEDICATIONS:  Discharge Medication List as of 7/14/2022  7:00 PM      STOP taking these medications       meloxicam (MOBIC) 7.5 MG tablet Comments:   Reason for Stopping:                      (Please note that portions of this note were completed with a voice recognition program.  Efforts were made to edit the dictations but occasionally words are mis-transcribed.)    Aleena Umaña PA-C (electronically signed)           Aleena Umaña PA-C  07/14/22 1923

## 2022-08-26 ENCOUNTER — HOSPITAL ENCOUNTER (EMERGENCY)
Age: 87
Discharge: HOME | End: 2022-08-26

## 2023-03-29 ENCOUNTER — APPOINTMENT (OUTPATIENT)
Dept: GENERAL RADIOLOGY | Age: 88
End: 2023-03-29
Payer: MEDICARE

## 2023-03-29 ENCOUNTER — HOSPITAL ENCOUNTER (EMERGENCY)
Age: 88
Discharge: HOME | End: 2023-03-29
Payer: MEDICARE

## 2023-03-29 VITALS
RESPIRATION RATE: 16 BRPM | OXYGEN SATURATION: 97 % | TEMPERATURE: 97.6 F | SYSTOLIC BLOOD PRESSURE: 130 MMHG | DIASTOLIC BLOOD PRESSURE: 63 MMHG | HEART RATE: 96 BPM

## 2023-03-29 DIAGNOSIS — S52.502A CLOSED FRACTURE OF DISTAL END OF LEFT RADIUS, UNSPECIFIED FRACTURE MORPHOLOGY, INITIAL ENCOUNTER: Primary | ICD-10-CM

## 2023-03-29 PROCEDURE — 99283 EMERGENCY DEPT VISIT LOW MDM: CPT

## 2023-03-29 PROCEDURE — 73110 X-RAY EXAM OF WRIST: CPT

## 2023-03-29 PROCEDURE — 29125 APPL SHORT ARM SPLINT STATIC: CPT

## 2023-03-29 NOTE — ED NOTES
Discharge and education instructions reviewed. Patient verbalized understanding, teach-back successful. Patient denied questions at this time. No acute distress noted. Patient instructed to follow-up as noted - return to emergency department if symptoms worsen. Patient verbalized understanding. Discharged per EDMD with discharged instructions.  Patient discharged with Volar OCL applied to L wrist.        Samir Milner RN  03/29/23 [id number]

## 2023-03-30 ENCOUNTER — TELEPHONE (OUTPATIENT)
Dept: ORTHOPEDIC SURGERY | Age: 88
End: 2023-03-30

## 2023-03-30 ASSESSMENT — ENCOUNTER SYMPTOMS
SHORTNESS OF BREATH: 0
RHINORRHEA: 0
VOMITING: 0
NAUSEA: 0
COUGH: 0
DIARRHEA: 0
ABDOMINAL PAIN: 0

## 2023-03-30 NOTE — TELEPHONE ENCOUNTER
I spoke with Hermes Robertson. He asked to take our number and have his wife call back when she wakes up today. Patient can be seen today @ 1 PM or any opening today in FF office with Dr Yancy Quarles.

## 2023-03-30 NOTE — ED PROVIDER NOTES
patient is NOT to be included for SEP-1 Core Measure due to: Infection is not suspected    Chronic Conditions affecting care:   has a past medical history of Hyperlipidemia and Hypertension. CONSULTS: (Who and What was discussed)  None      Social Determinants Significantly Affecting Health : None    Records Reviewed (External and Source) records from [facility] in [place]    CC/HPI Summary, DDx, ED Course, and Reassessment: Briefly, this is a 30-year-old male who presents to the emergency department today for evaluation for a fall which occurred over 2 days ago. The patient was in [place] when the fall occurred. He was evaluated at an outside facility there, was admitted. Patient was complaining of some minimal pain to the left wrist as well as some pain and swelling, however family wanted to go home to [place] have this evaluated here. Patient's vital signs are stable he is otherwise acting at baseline. He does have some edema, ecchymosis noted to the left distal radius. He is otherwise neurovascularly intact    Disposition Considerations (tests considered but not done, Admit vs D/C, Shared Decision Making, Pt Expectation of Test or Tx.):    In review the patient's chart he did have multiple CT imaging, as well as lab work performed. Patient has not fallen recently he is already been evaluated for this fall and I do not feel that this needs to be repeated    X-ray of the left wrist today does show an impaction fracture of the distal radius he was placed in a volar splint. I did discuss pain medications however patient states he does not have much pain. He will be referred to orthopedics for follow-up within the next week. He is to return to the ED for any new or worsening symptoms. Patient and wife voiced understanding and are agreeable with plan, stable for discharge. No results found for this visit on 03/29/23.     I estimate there is LOW risk for COMPARTMENT SYNDROME, DEEP VENOUS THROMBOSIS, SEPTIC ARTHRITIS, TENDON OR NEUROVASCULAR INJURY, thus I consider the discharge disposition reasonable. Ted Light and I have discussed the diagnosis and risks, and we agree with discharging home to follow-up with their primary doctor or the referral orthopedist. We also discussed returning to the Emergency Department immediately if new or worsening symptoms occur. We have discussed the symptoms which are most concerning (e.g., changing or worsening pain, numbness, weakness) that necessitate immediate return. I am the Primary Clinician of Record. FINAL IMPRESSION      1.  Closed fracture of distal end of left radius, unspecified fracture morphology, initial encounter          DISPOSITION/PLAN     DISPOSITION Decision To Discharge 03/29/2023 07:38:17 PM      PATIENT REFERRED TO:  Ayden Espinoza MD  [address], [address] [place] [address]  [phone]    Schedule an appointment as soon as possible for a visit in 1 week      [facility] Emergency Department  [address] [place] [address]  [phone]    As needed, If symptoms worsen      DISCHARGE MEDICATIONS:  Discharge Medication List as of 3/29/2023  7:36 PM          DISCONTINUED MEDICATIONS:  Discharge Medication List as of 3/29/2023  7:36 PM        STOP taking these medications       meloxicam (MOBIC) 7.5 MG tablet Comments:   Reason for Stopping:                      (Please note that portions of this note were completed with a voice recognition program.  Efforts were made to edit the dictations but occasionally words are mis-transcribed.)    Niurka Ortiz PA-C (electronically signed)       Niurka Ortiz PA-C  03/30/23 0153

## 2023-04-04 ENCOUNTER — OFFICE VISIT (OUTPATIENT)
Dept: ORTHOPEDIC SURGERY | Age: 88
End: 2023-04-04

## 2023-04-04 VITALS — HEIGHT: 75 IN | BODY MASS INDEX: 18.2 KG/M2

## 2023-04-04 DIAGNOSIS — S52.592A OTHER CLOSED FRACTURE OF DISTAL END OF LEFT RADIUS, INITIAL ENCOUNTER: Primary | ICD-10-CM

## 2023-04-04 PROBLEM — S52.502A CLOSED FRACTURE OF LEFT DISTAL RADIUS: Status: ACTIVE | Noted: 2023-04-04

## 2023-04-04 NOTE — PROGRESS NOTES
of left wrist were reviewed, and showed minimally displaced distal radius fracture. IMPRESSION:  Left minimally displaced distal radius fracture. PLAN: I discussed that the overall alignment of this fracture is good and that we can try to treat this non-operatively in a brace left wrist, with no heavy impact activities. We applied a brace today in the office and instructed him  in care. We discussed the risk of nonunion and or malunion. We will see him  back in 6 weeks at which time we will get a new xray of the left wrist.      As this patient has demonstrated risk factors for osteoporosis, such as age greater than [de-identified] years and evidence of a fracture, I have referred the patient back to the primary care physician for evaluation for osteoporosis, including consideration for DEXA scanning, if this is felt to be clinically indicated. The patient is advised to contact the primary care physician to follow-up for further evaluation. Procedures    [place] CLTX DSTL RADIAL FX/EPIPHYSL SEP W/O MANJ    Felipa Yadav Titan Wrist Long Forearm Brace     Patient was prescribed a Felipa Yadav Titan Wrist and Forearm Brace. The left wrist will require stabilization / immobilization from this semi-rigid / rigid orthosis to improve their function. The orthosis will assist in protecting the affected area, provide functional support and facilitate healing. The patient was educated and fit by a healthcare professional with expert knowledge and specialization in brace application while under the direct supervision of the treating physician. Verbal and written instructions for the use of and application of this item were provided. They were instructed to contact the office immediately should the brace result in increased pain, decreased sensation, increased swelling or worsening of the condition.      Huang León MD

## 2023-05-16 ENCOUNTER — OFFICE VISIT (OUTPATIENT)
Dept: ORTHOPEDIC SURGERY | Age: 88
End: 2023-05-16

## 2023-05-16 VITALS — HEIGHT: 75 IN | BODY MASS INDEX: 18.03 KG/M2 | WEIGHT: 145 LBS

## 2023-05-16 DIAGNOSIS — S52.592A OTHER CLOSED FRACTURE OF DISTAL END OF LEFT RADIUS, INITIAL ENCOUNTER: Primary | ICD-10-CM

## 2023-05-16 PROCEDURE — 99024 POSTOP FOLLOW-UP VISIT: CPT | Performed by: NURSE PRACTITIONER

## 2023-05-16 PROCEDURE — APPNB15 APP NON BILLABLE TIME 0-15 MINS: Performed by: NURSE PRACTITIONER

## 2023-05-16 NOTE — PROGRESS NOTES
CHIEF COMPLAINT: Left wrist pain/ minimally displaced distal radius fracture. DATE OF INJURY: 3/27/2023, [place] 4/4/2023    HISTORY:  Mr. Ilana Kuhn is a 81 y.o.  male right handed who presents today for follow-up evaluation of a left wrist injury, which occurred when he was in [facility] and fell in a boat while fishing. He was first seen and evaluated in [facility], then here in , when he was x-rayed and splinted, and asked to f/u with Orthopedics. The patient denies any other injuries. Rates pain a 0/10 VAS is doing much better. He has bring in a brace. No numbness or tingling sensation. Past Medical History:   Diagnosis Date    Hyperlipidemia     Hypertension        Past Surgical History:   Procedure Laterality Date    COLONOSCOPY N/A 1/16/2019    COLONOSCOPY performed by Isa Man MD at [address] Left 2018    THROMBECTOMY Right        Social History     Socioeconomic History    Marital status:      Spouse name: Not on file    Number of children: Not on file    Years of education: Not on file    Highest education level: Not on file   Occupational History    Not on file   Tobacco Use    Smoking status: Former     Packs/day: 1.00     Years: 40.00     Pack years: 40.00     Types: Cigarettes    Smokeless tobacco: Never   Vaping Use    Vaping Use: Never used   Substance and Sexual Activity    Alcohol use:  Yes     Alcohol/week: 4.2 standard drinks     Types: 5 Standard drinks or equivalent per week     Comment: 1 BEER/DAY    Drug use: No    Sexual activity: Not on file   Other Topics Concern    Not on file   Social History Narrative    Not on file     Social Determinants of Health     Financial Resource Strain: Low Risk     Difficulty of Paying Living Expenses: Not hard at all   Food Insecurity: No Food Insecurity    Worried About Running Out of Food in the Last Year: Never true    Ran Out of Food in the Last Year: Never true   Transportation Needs: No Transportation Needs

## 2023-07-11 ENCOUNTER — OFFICE VISIT (OUTPATIENT)
Dept: ORTHOPEDIC SURGERY | Age: 88
End: 2023-07-11

## 2023-07-11 VITALS — HEIGHT: 75 IN | WEIGHT: 145 LBS | BODY MASS INDEX: 18.03 KG/M2

## 2023-07-11 DIAGNOSIS — S52.592A OTHER CLOSED FRACTURE OF DISTAL END OF LEFT RADIUS, INITIAL ENCOUNTER: Primary | ICD-10-CM

## 2023-07-11 NOTE — PROGRESS NOTES
CHIEF COMPLAINT: Left wrist pain/ minimally displaced distal radius fracture. DATE OF INJURY: 3/27/2023, [place] 4/4/2023    HISTORY:  Mr. Lazaro Meyers is a 81 y.o.  male right handed who presents today for follow-up evaluation of a left wrist injury, which occurred when he was in [facility] and fell in a boat while fishing. He was first seen and evaluated in [facility], then here in , when he was x-rayed and splinted, and asked to f/u with Orthopedics. The patient denies any other injuries. Rates pain a 0/10 VAS is doing much better. He states he feels like his left wrist is back to normal.  No numbness or tingling sensation. Past Medical History:   Diagnosis Date    Hyperlipidemia     Hypertension        Past Surgical History:   Procedure Laterality Date    COLONOSCOPY N/A 1/16/2019    COLONOSCOPY performed by Tommie Agrawal MD at [phone] [place] [address] Left 2018    THROMBECTOMY Right        Social History     Socioeconomic History    Marital status:      Spouse name: Not on file    Number of children: Not on file    Years of education: Not on file    Highest education level: Not on file   Occupational History    Not on file   Tobacco Use    Smoking status: Former     Packs/day: 1.00     Years: 40.00     Pack years: 40.00     Types: Cigarettes    Smokeless tobacco: Never   Vaping Use    Vaping Use: Never used   Substance and Sexual Activity    Alcohol use:  Yes     Alcohol/week: 4.2 standard drinks     Types: 5 Standard drinks or equivalent per week     Comment: 1 BEER/DAY    Drug use: No    Sexual activity: Not on file   Other Topics Concern    Not on file   Social History Narrative    Not on file     Social Determinants of Health     Financial Resource Strain: Not on file   Food Insecurity: Not on file   Transportation Needs: Not on file   Physical Activity: Not on file   Stress: Not on file   Social Connections: Not on file   Intimate Partner Violence: Not on file   Housing Stability: Not

## (undated) DEVICE — GOWN AURORA NONREINF LG: Brand: MEDLINE INDUSTRIES, INC.

## (undated) DEVICE — BW-412T DISP COMBO CLEANING BRUSH: Brand: SINGLE USE COMBINATION CLEANING BRUSH

## (undated) DEVICE — SET VLV 3 PC AWS DISPOSABLE GRDIAN SCOPEVALET

## (undated) DEVICE — SOLUTION IV IRRIG WATER 500ML POUR BRL ST 2F7113

## (undated) DEVICE — PROCEDURE KIT ENDOSCP CUST

## (undated) DEVICE — 60 ML SYRINGE,REGULAR TIP: Brand: MONOJECT